# Patient Record
Sex: FEMALE | Race: OTHER
[De-identification: names, ages, dates, MRNs, and addresses within clinical notes are randomized per-mention and may not be internally consistent; named-entity substitution may affect disease eponyms.]

---

## 2017-02-03 ENCOUNTER — RX RENEWAL (OUTPATIENT)
Age: 42
End: 2017-02-03

## 2022-06-17 ENCOUNTER — APPOINTMENT (OUTPATIENT)
Dept: ORTHOPEDIC SURGERY | Facility: CLINIC | Age: 47
End: 2022-06-17
Payer: COMMERCIAL

## 2022-06-17 VITALS — BODY MASS INDEX: 22.58 KG/M2 | WEIGHT: 115 LBS | HEIGHT: 60 IN

## 2022-06-17 PROCEDURE — 99203 OFFICE O/P NEW LOW 30 MIN: CPT

## 2022-06-17 NOTE — HISTORY OF PRESENT ILLNESS
[Sudden] : sudden [4] : 4 [1] : 2 [Burning] : burning [Dull/Aching] : dull/aching [Occasional] : occasional [Leisure] : leisure [Nothing helps with pain getting better] : Nothing helps with pain getting better [de-identified] : 46 y/o female presents for her initial consultation. reports that she slipped and fell down her stairs outside leading up to her patio on 06/04/22. Patient c/o buttock pain. Patient reports that she was evaluated by her PCP and was sent for X-Rays. Patient reports that sitting exacerbates her pain. Patient reports that her pain has improved since its initial onset. Patient reports that she has been using a special pillow when sitting, she finds it helpful.  [] : no [FreeTextEntry1] : [place] [FreeTextEntry3] : 6/4/22 [FreeTextEntry5] : fell down the stairs  [de-identified] : bending/ activity  [de-identified] : 6/6/22 [de-identified] : Dr. Shawnee Michael  [de-identified] : Xray @ [facility] [de-identified] : [facility]

## 2022-06-17 NOTE — ASSESSMENT
[FreeTextEntry1] : 46 y/o female with S5 non-displaced closed sacrum/ coccyx fracture. I advised the patient to invest in a sacral relief cushion. I advised the patient that her injury should heal with time - already seeing pain reduction.  I would like to follow up with the patient in 1 month to re-evaluate the status of her condition. \par \par Prior to appointment and during encounter with patient extensive medical records were reviewed including but not limited to, hospital records, out patient records, imaging results, and lab data. During this appointment the patient was examined, diagnoses were discussed and explained in a face to face manner. In addition extensive time was spent reviewing aforementioned diagnostic studies. Counseling including abnormal image results, differential diagnoses, treatment options, risk and benefits, lifestyle changes, current condition, and current medications was performed. Patient's comments, questions, and concerns were address and patient verbalized understanding. Based on this patient's presentation at our office, which is an orthopedic spine surgeon's office, this patient inherently / intrinsically has a risk, however minute, of developing issues such as Cauda equina syndrome, bowel and bladder changes, or progression of motor or neurological deficits such as paralysis which may be permanent.\par \par I, Kyaw Tabares, attest that this documentation has been prepared under the direction and in the presence of provider Koko Cox MD.

## 2022-06-17 NOTE — PHYSICAL EXAM
[NL (90)] : forward flexion 90 degrees [NL (30)] : right lateral rotation 30 degrees [NL (45)] : extension 45 degrees [NL (40)] : right lateral bending 40 degrees [5___] : right extensor hallicus longus 5[unfilled]/5 [de-identified] : Constitutional:\par -  General Appearance: \par Unremarkable\par Body Habitus\par Well Developed \par Well Nourished\par Body Habitus\par No Deformities\par Well Groomed\par \par Ability To communicate:\par Normal\par \par Neurologic: \par Global sensation is intact to upper and lower extremities.  See examination of Neck and/or Spine for exceptions.\par Orientation to Time, Place and Person is: Normal\par Mood And Affect is Normal\par \par Skin:\par -  Head/Face, Right Upper/Lower Extremity, Left Upper/Lower Extremity: Normal\par See Examination of Neck and/or Spine for exceptions\par \par Cardiovascular:\par Peripheral Cardiovascular System is Normal\par \par Palpation of Lymph Nodes:\par Normal Palpation of lymph nodes in: Axilla, Cervical, Inguinal\par Abnormal Palpation of lymph nodes in: None  [] : non-antalgic

## 2022-06-17 NOTE — DATA REVIEWED
[Lumbar Spine] : lumbar spine [Outside X-rays] : outside x-rays [Right] : of the right [Shoulder] : shoulder [I independently reviewed and interpreted images and report] : I independently reviewed and interpreted images and report [FreeTextEntry1] : Lumbar Spine X-Ray (06/06/22) - [facility]\par On my interpretation of the images\par 1) Possibility for S5 level sacrum/coccyx fracture  \par 2) possible transitional level L5-S1\par 3) There is an additional sacrum/pelvis view that confirms a S5 level fracture  [FreeTextEntry2] : Right Shoulder X-Ray (06/06/22) - [facility] \par On my interpretation of the images\par 1) Normal

## 2022-07-22 ENCOUNTER — APPOINTMENT (OUTPATIENT)
Dept: ORTHOPEDIC SURGERY | Facility: CLINIC | Age: 47
End: 2022-07-22

## 2022-07-22 VITALS — HEIGHT: 60 IN | WEIGHT: 115 LBS | BODY MASS INDEX: 22.58 KG/M2

## 2022-07-22 PROCEDURE — 99214 OFFICE O/P EST MOD 30 MIN: CPT

## 2022-07-27 NOTE — PHYSICAL EXAM
[] : light touch intact throughout both lower extremities [de-identified] : Constitutional:\par - General Appearance: \par Unremarkable\par Body Habitus\par Well Developed \par Well Nourished\par Body Habitus\par No Deformities\par Well Groomed\par \par Ability To communicate:\par Normal\par \par Neurologic: \par Global sensation is intact to upper and lower extremities. See examination of Neck and/or Spine for exceptions.\par Orientation to Time, Place and Person is: Normal\par Mood And Affect is Mild Distress\par \par Skin:\par - Head/Face, Right Upper/Lower Extremity, Left Upper/Lower Extremity: Normal\par See Examination of Neck and/or Spine for exceptions\par \par Cardiovascular:\par Peripheral Cardiovascular System is Normal\par \par Palpation of Lymph Nodes:\par Normal Palpation of lymph nodes in: Axilla, Cervical, Inguinal\par Abnormal Palpation of lymph nodes in: None\par  [FreeTextEntry8] : tenderness at the coccyx

## 2022-07-27 NOTE — ASSESSMENT
[FreeTextEntry1] : 47F with S5 non-displaced closed sacrum/ coccyx fracture. Patient is showing good improvements. I would like to follow up with the patient in 2 months.

## 2022-07-27 NOTE — HISTORY OF PRESENT ILLNESS
[Lower back] : lower back [3] : 3 [de-identified] : 47F following up for S5 non-displaced closed sacrum/ coccyx fracture. They still have some tenderness in the sacrum region. They have been using sacral relief cushion. Pain rating is a 2-3/10. \par  [de-identified] : None

## 2022-09-23 ENCOUNTER — APPOINTMENT (OUTPATIENT)
Dept: ORTHOPEDIC SURGERY | Facility: CLINIC | Age: 47
End: 2022-09-23

## 2022-09-23 VITALS — HEIGHT: 60 IN | BODY MASS INDEX: 22.58 KG/M2 | WEIGHT: 115 LBS

## 2022-09-23 DIAGNOSIS — S32.10XA UNSPECIFIED FRACTURE OF SACRUM, INITIAL ENCOUNTER FOR CLOSED FRACTURE: ICD-10-CM

## 2022-09-23 DIAGNOSIS — S32.2XXA UNSPECIFIED FRACTURE OF SACRUM, INITIAL ENCOUNTER FOR CLOSED FRACTURE: ICD-10-CM

## 2022-09-23 PROCEDURE — 99213 OFFICE O/P EST LOW 20 MIN: CPT

## 2022-09-23 NOTE — ASSESSMENT
[FreeTextEntry1] : 46 y/o female with S5 non-displaced closed fracture  with minimal pain.  I advised the patient to continue her current level of activity.  Patient was provided with a lumbar home exercise program.  I would like to follow up with the patient on an as needed basis moving forward. \par \par Prior to appointment and during encounter with patient extensive medical records were reviewed including but not limited to, hospital records, out patient records, imaging results, and lab data. During this appointment the patient was examined, diagnoses were discussed and explained in a face to face manner. In addition extensive time was spent reviewing aforementioned diagnostic studies. Counseling including abnormal image results, differential diagnoses, treatment options, risk and benefits, lifestyle changes, current condition, and current medications was performed. Patient's comments, questions, and concerns were address and patient verbalized understanding. Based on this patient's presentation at our office, which is an orthopedic spine surgeon's office, this patient inherently / intrinsically has a risk, however minute, of developing issues such as Cauda equina syndrome, bowel and bladder changes, or progression of motor or neurological deficits such as paralysis which may be permanent.\par \par I, Kyaw Tabares, attest that this documentation has been prepared under the direction and in the presence of provider Koko Cox MD.

## 2022-09-23 NOTE — PHYSICAL EXAM
[NL (90)] : forward flexion 90 degrees [NL (30)] : right lateral rotation 30 degrees [NL (45)] : extension 45 degrees [NL (40)] : right lateral bending 40 degrees [5___] : right extensor hallicus longus 5[unfilled]/5 [de-identified] : Constitutional:\par -  General Appearance: \par Unremarkable\par Body Habitus\par Well Developed \par Well Nourished\par Body Habitus\par No Deformities\par Well Groomed\par \par Ability To communicate:\par Normal\par \par Neurologic: \par Global sensation is intact to upper and lower extremities.  See examination of Neck and/or Spine for exceptions.\par Orientation to Time, Place and Person is: Normal\par Mood And Affect is Normal\par \par Skin:\par -  Head/Face, Right Upper/Lower Extremity, Left Upper/Lower Extremity: Normal\par See Examination of Neck and/or Spine for exceptions\par \par Cardiovascular:\par Peripheral Cardiovascular System is Normal\par \par Palpation of Lymph Nodes:\par Normal Palpation of lymph nodes in: Axilla, Cervical, Inguinal\par Abnormal Palpation of lymph nodes in: None  [] : non-antalgic

## 2022-09-23 NOTE — HISTORY OF PRESENT ILLNESS
[Lower back] : lower back [3] : 3 [de-identified] : \par The patient is a 47 year female who presents today follow up \par Date of Injury/Onset: \par Pain:  At Rest: 0/10 \par With Activity:  2/10 \par Mechanism of injury:\par Quality of symptoms:\par Improves with:\par Worse with: bending\par Treatment/Imaging/Studies Since Last Visit: _\par Reports Available For Review Today: _\par Change since last visit: \par Additional Information: None\par ----------------------------\par 48 y/o female following up for S5 non-displaced closed sacrum/ coccyx fracture. reports some mild residual tenderness in the sacrum region. They have been using sacral relief cushion. Pain rating is a 2-3/10.  [de-identified] : None